# Patient Record
Sex: MALE | Race: WHITE | NOT HISPANIC OR LATINO | URBAN - METROPOLITAN AREA
[De-identification: names, ages, dates, MRNs, and addresses within clinical notes are randomized per-mention and may not be internally consistent; named-entity substitution may affect disease eponyms.]

---

## 2018-04-13 ENCOUNTER — OUTPATIENT (OUTPATIENT)
Dept: OUTPATIENT SERVICES | Facility: HOSPITAL | Age: 83
LOS: 1 days | Discharge: HOME | End: 2018-04-13

## 2018-04-13 DIAGNOSIS — J45.909 UNSPECIFIED ASTHMA, UNCOMPLICATED: ICD-10-CM

## 2023-05-10 PROBLEM — Z00.00 ENCOUNTER FOR PREVENTIVE HEALTH EXAMINATION: Status: ACTIVE | Noted: 2023-05-10

## 2023-05-11 ENCOUNTER — OUTPATIENT (OUTPATIENT)
Dept: OUTPATIENT SERVICES | Facility: HOSPITAL | Age: 88
LOS: 1 days | End: 2023-05-11
Payer: MEDICARE

## 2023-05-11 DIAGNOSIS — N64.4 MASTODYNIA: ICD-10-CM

## 2023-05-11 DIAGNOSIS — R92.8 OTHER ABNORMAL AND INCONCLUSIVE FINDINGS ON DIAGNOSTIC IMAGING OF BREAST: ICD-10-CM

## 2023-05-11 PROCEDURE — 76642 ULTRASOUND BREAST LIMITED: CPT | Mod: 26,50

## 2023-05-11 PROCEDURE — 76642 ULTRASOUND BREAST LIMITED: CPT | Mod: 50

## 2023-05-11 PROCEDURE — 77066 DX MAMMO INCL CAD BI: CPT | Mod: 26

## 2023-05-11 PROCEDURE — G0279: CPT | Mod: 26

## 2023-05-11 PROCEDURE — 77066 DX MAMMO INCL CAD BI: CPT

## 2023-05-11 PROCEDURE — G0279: CPT

## 2023-05-12 DIAGNOSIS — R92.8 OTHER ABNORMAL AND INCONCLUSIVE FINDINGS ON DIAGNOSTIC IMAGING OF BREAST: ICD-10-CM

## 2023-05-14 ENCOUNTER — OUTPATIENT (OUTPATIENT)
Dept: OUTPATIENT SERVICES | Facility: HOSPITAL | Age: 88
LOS: 1 days | End: 2023-05-14
Payer: MEDICARE

## 2023-05-14 DIAGNOSIS — N50.82 SCROTAL PAIN: ICD-10-CM

## 2023-05-14 DIAGNOSIS — Z00.8 ENCOUNTER FOR OTHER GENERAL EXAMINATION: ICD-10-CM

## 2023-05-14 PROCEDURE — 76870 US EXAM SCROTUM: CPT

## 2023-05-14 PROCEDURE — 76870 US EXAM SCROTUM: CPT | Mod: 26

## 2023-05-15 DIAGNOSIS — N50.82 SCROTAL PAIN: ICD-10-CM

## 2023-06-21 ENCOUNTER — APPOINTMENT (OUTPATIENT)
Dept: UROLOGY | Facility: CLINIC | Age: 88
End: 2023-06-21
Payer: MEDICARE

## 2023-06-21 VITALS
WEIGHT: 137 LBS | HEIGHT: 60 IN | SYSTOLIC BLOOD PRESSURE: 145 MMHG | HEART RATE: 88 BPM | OXYGEN SATURATION: 97 % | BODY MASS INDEX: 26.9 KG/M2 | DIASTOLIC BLOOD PRESSURE: 78 MMHG

## 2023-06-21 DIAGNOSIS — N40.0 BENIGN PROSTATIC HYPERPLASIA WITHOUT LOWER URINARY TRACT SYMPMS: ICD-10-CM

## 2023-06-21 PROCEDURE — 99204 OFFICE O/P NEW MOD 45 MIN: CPT

## 2023-06-21 RX ORDER — TAMSULOSIN HYDROCHLORIDE 0.4 MG/1
0.4 CAPSULE ORAL
Qty: 90 | Refills: 3 | Status: ACTIVE | COMMUNITY
Start: 2023-06-21 | End: 1900-01-01

## 2023-06-21 NOTE — HISTORY OF PRESENT ILLNESS
[FreeTextEntry1] : Language: Dionicion\par Accompanied by: Wife\gabriel Contact info:  [phone] (home - preferred)\par Referring Provider/PCP:  Fausto Ojeda \par Son helped find via google\par \par \par Initial H&P 06/21/2023:\par Mr. Cartagena is a very pleasant 87 year old gentleman who presents today for initial evaluation of right hydrocele. \par \par Denies pain, denies skin irritation, denies sitting on his testicle as a result of the hydrocele. \par \par Noticed the problem about 3 months ago. \par \par Was sent for GIUSEPPE.  Has report with him today. \par \par Also c/o weak stream, frequency, sensation of incomplete emptying, intermittency, straining to void.\par Denies urgency, UTIs, GH, CINDY, PVD.\par 1x/night voiding. \par \par Has never seen urologist before. \par ---------------------------------------------------------------------------------------------------------------------------------------\par PMHx: Asthma\par PSHx: None\par SHx: Denies x3\par FHx: no known FHx  malignancy\par \par All: NKDA\par \par 14 pt ROS neg other than HPI\par ---------------------------------------------------------------------------------------------------------------------------------------\par Physical Exam:\par General: NAD, sitting on exam table comfortably\par HEENT: NCAT, EOMI\par Resp: breathing comfortably on RA, b/l symm chest rise\par Cardiac: RRR\par Abd: SNTND\par Back: (-) CVAT b/l\par MSK: GRAF w/ FROM\par Psych: appropriate affect\par : normal appearing circumcised phallus, normal meatus,  b/l desc testes, normal appearing scrotum, visible size difference between left and right sides, right hydrocele palpable with no appreciable defects, nontender, left testis mildly atrophic with no abnormal fidnings\par ---------------------------------------------------------------------------------------------------------------------------------------\par Results:\par \par ---------------------------------------------------------------------------------------------------------------------------------------\par A/P: 87M with Right Hydrocele and BPH.  Pt voided prior to evaluation today, therefore uroflow was not performed today.\par \par 1. Right Hydrocele\par Hydrocele is small (only 85cc on US) and not bothersome.  Discussed that operative intervention only recommended if hydrocele is bothersome in some way.  Pt elected to continue monitoring conservatively.\par \par 2. BPH\par We discussed that his constellation of urinary symptoms are likely due to BPH.  I explained the pathophysiology and natural course of prostatic enlargement, and how it causes the symptoms he is experiencing.  We briefly touched upon the various treatment options for BPH, including but not limited to to p.o. medications, office-based procedures, and surgery.  Among the p.o. medications, we specifically discussed alpha-blocker therapy as well as 5-Manoj.  To start, I recommended alpha-blocker therapy, specifically tamsulosin 0.4 mg daily. AEs were discussed, including but not limited to anejaculation, orthostatic hypotension, dizziness, lightheadedness, and sinus issues.\par \par Patient was interested in starting tamsulosin.  Discussed importance of taking it once daily, at night, consistently.  I will see him again in 6 weeks to reassess.\par \par I have also provided him with information on a Tajik-speaking Urologist in [place] (Dr. Danyel Partida), given the difficulty with commute to [place].  He will consider f/u and continuation of care with Dr. Partida in the future, if [place] travel continues to be difficult for him and his wife.\par \par Plan:\par - start tamsulosin\par - RTC 6 weeks or sooner PRN

## 2025-01-27 ENCOUNTER — INPATIENT (INPATIENT)
Facility: HOSPITAL | Age: 89
LOS: 0 days | Discharge: HOME HEALTH | DRG: 603 | End: 2025-01-28
Attending: STUDENT IN AN ORGANIZED HEALTH CARE EDUCATION/TRAINING PROGRAM | Admitting: STUDENT IN AN ORGANIZED HEALTH CARE EDUCATION/TRAINING PROGRAM
Payer: MEDICARE

## 2025-01-27 VITALS
DIASTOLIC BLOOD PRESSURE: 80 MMHG | RESPIRATION RATE: 18 BRPM | SYSTOLIC BLOOD PRESSURE: 166 MMHG | HEART RATE: 89 BPM | WEIGHT: 160.06 LBS | OXYGEN SATURATION: 100 % | HEIGHT: 67 IN | TEMPERATURE: 98 F

## 2025-01-27 DIAGNOSIS — L03.90 CELLULITIS, UNSPECIFIED: ICD-10-CM

## 2025-01-27 LAB
ALBUMIN SERPL ELPH-MCNC: 4.2 G/DL — SIGNIFICANT CHANGE UP (ref 3.5–5.2)
ALP SERPL-CCNC: 44 U/L — SIGNIFICANT CHANGE UP (ref 30–115)
ALT FLD-CCNC: 8 U/L — SIGNIFICANT CHANGE UP (ref 0–41)
ANION GAP SERPL CALC-SCNC: 10 MMOL/L — SIGNIFICANT CHANGE UP (ref 7–14)
AST SERPL-CCNC: 14 U/L — SIGNIFICANT CHANGE UP (ref 0–41)
BASOPHILS # BLD AUTO: 0.05 K/UL — SIGNIFICANT CHANGE UP (ref 0–0.2)
BASOPHILS NFR BLD AUTO: 0.4 % — SIGNIFICANT CHANGE UP (ref 0–1)
BILIRUB SERPL-MCNC: 0.5 MG/DL — SIGNIFICANT CHANGE UP (ref 0.2–1.2)
BUN SERPL-MCNC: 25 MG/DL — HIGH (ref 10–20)
CALCIUM SERPL-MCNC: 9.5 MG/DL — SIGNIFICANT CHANGE UP (ref 8.4–10.5)
CHLORIDE SERPL-SCNC: 104 MMOL/L — SIGNIFICANT CHANGE UP (ref 98–110)
CO2 SERPL-SCNC: 29 MMOL/L — SIGNIFICANT CHANGE UP (ref 17–32)
CREAT SERPL-MCNC: 1.5 MG/DL — SIGNIFICANT CHANGE UP (ref 0.7–1.5)
EGFR: 45 ML/MIN/1.73M2 — LOW
EOSINOPHIL # BLD AUTO: 0.09 K/UL — SIGNIFICANT CHANGE UP (ref 0–0.7)
EOSINOPHIL NFR BLD AUTO: 0.7 % — SIGNIFICANT CHANGE UP (ref 0–8)
ERYTHROCYTE [SEDIMENTATION RATE] IN BLOOD: 56 MM/HR — HIGH (ref 0–10)
GLUCOSE SERPL-MCNC: 122 MG/DL — HIGH (ref 70–99)
HCT VFR BLD CALC: 41.9 % — LOW (ref 42–52)
HGB BLD-MCNC: 13.4 G/DL — LOW (ref 14–18)
IMM GRANULOCYTES NFR BLD AUTO: 0.4 % — HIGH (ref 0.1–0.3)
LYMPHOCYTES # BLD AUTO: 1.9 K/UL — SIGNIFICANT CHANGE UP (ref 1.2–3.4)
LYMPHOCYTES # BLD AUTO: 15.6 % — LOW (ref 20.5–51.1)
MCHC RBC-ENTMCNC: 29.6 PG — SIGNIFICANT CHANGE UP (ref 27–31)
MCHC RBC-ENTMCNC: 32 G/DL — SIGNIFICANT CHANGE UP (ref 32–37)
MCV RBC AUTO: 92.5 FL — SIGNIFICANT CHANGE UP (ref 80–94)
MONOCYTES # BLD AUTO: 1.52 K/UL — HIGH (ref 0.1–0.6)
MONOCYTES NFR BLD AUTO: 12.5 % — HIGH (ref 1.7–9.3)
NEUTROPHILS # BLD AUTO: 8.55 K/UL — HIGH (ref 1.4–6.5)
NEUTROPHILS NFR BLD AUTO: 70.4 % — SIGNIFICANT CHANGE UP (ref 42.2–75.2)
NRBC # BLD: 0 /100 WBCS — SIGNIFICANT CHANGE UP (ref 0–0)
NRBC BLD-RTO: 0 /100 WBCS — SIGNIFICANT CHANGE UP (ref 0–0)
PLATELET # BLD AUTO: 256 K/UL — SIGNIFICANT CHANGE UP (ref 130–400)
PMV BLD: 10.1 FL — SIGNIFICANT CHANGE UP (ref 7.4–10.4)
POTASSIUM SERPL-MCNC: 4.5 MMOL/L — SIGNIFICANT CHANGE UP (ref 3.5–5)
POTASSIUM SERPL-SCNC: 4.5 MMOL/L — SIGNIFICANT CHANGE UP (ref 3.5–5)
PROT SERPL-MCNC: 6.9 G/DL — SIGNIFICANT CHANGE UP (ref 6–8)
RBC # BLD: 4.53 M/UL — LOW (ref 4.7–6.1)
RBC # FLD: 13.8 % — SIGNIFICANT CHANGE UP (ref 11.5–14.5)
SODIUM SERPL-SCNC: 143 MMOL/L — SIGNIFICANT CHANGE UP (ref 135–146)
URATE SERPL-MCNC: 8.6 MG/DL — SIGNIFICANT CHANGE UP (ref 3.4–8.8)
WBC # BLD: 12.16 K/UL — HIGH (ref 4.8–10.8)
WBC # FLD AUTO: 12.16 K/UL — HIGH (ref 4.8–10.8)

## 2025-01-27 PROCEDURE — 73610 X-RAY EXAM OF ANKLE: CPT | Mod: 26,RT

## 2025-01-27 PROCEDURE — 99285 EMERGENCY DEPT VISIT HI MDM: CPT

## 2025-01-27 PROCEDURE — 86140 C-REACTIVE PROTEIN: CPT

## 2025-01-27 PROCEDURE — 76882 US LMTD JT/FCL EVL NVASC XTR: CPT | Mod: RT

## 2025-01-27 PROCEDURE — 36415 COLL VENOUS BLD VENIPUNCTURE: CPT

## 2025-01-27 PROCEDURE — 73610 X-RAY EXAM OF ANKLE: CPT | Mod: RT

## 2025-01-27 PROCEDURE — 85025 COMPLETE CBC W/AUTO DIFF WBC: CPT

## 2025-01-27 PROCEDURE — 85652 RBC SED RATE AUTOMATED: CPT

## 2025-01-27 PROCEDURE — 80053 COMPREHEN METABOLIC PANEL: CPT

## 2025-01-27 PROCEDURE — 99222 1ST HOSP IP/OBS MODERATE 55: CPT

## 2025-01-27 RX ORDER — ZOLPIDEM TARTRATE 5 MG/1
1 TABLET, COATED ORAL
Refills: 0 | DISCHARGE

## 2025-01-27 RX ORDER — CEFAZOLIN SODIUM IN 0.9 % NACL 2 G/10 ML
2000 SYRINGE (ML) INTRAVENOUS ONCE
Refills: 0 | Status: COMPLETED | OUTPATIENT
Start: 2025-01-27 | End: 2025-01-27

## 2025-01-27 RX ORDER — KETOROLAC TROMETHAMINE 10 MG
30 TABLET ORAL ONCE
Refills: 0 | Status: DISCONTINUED | OUTPATIENT
Start: 2025-01-27 | End: 2025-01-27

## 2025-01-27 RX ORDER — CEFAZOLIN SODIUM IN 0.9 % NACL 2 G/10 ML
SYRINGE (ML) INTRAVENOUS
Refills: 0 | Status: DISCONTINUED | OUTPATIENT
Start: 2025-01-27 | End: 2025-01-28

## 2025-01-27 RX ORDER — ALBUTEROL 90 MCG
2 AEROSOL REFILL (GRAM) INHALATION THREE TIMES A DAY
Refills: 0 | Status: DISCONTINUED | OUTPATIENT
Start: 2025-01-27 | End: 2025-01-28

## 2025-01-27 RX ORDER — ALBUTEROL 90 MCG
2 AEROSOL REFILL (GRAM) INHALATION
Refills: 0 | DISCHARGE

## 2025-01-27 RX ORDER — ACETAMINOPHEN 160 MG/5ML
650 SUSPENSION ORAL EVERY 6 HOURS
Refills: 0 | Status: DISCONTINUED | OUTPATIENT
Start: 2025-01-27 | End: 2025-01-28

## 2025-01-27 RX ORDER — CEFAZOLIN SODIUM IN 0.9 % NACL 2 G/10 ML
2000 SYRINGE (ML) INTRAVENOUS EVERY 8 HOURS
Refills: 0 | Status: DISCONTINUED | OUTPATIENT
Start: 2025-01-27 | End: 2025-01-28

## 2025-01-27 RX ORDER — ZOLPIDEM TARTRATE 5 MG/1
10 TABLET, COATED ORAL AT BEDTIME
Refills: 0 | Status: DISCONTINUED | OUTPATIENT
Start: 2025-01-27 | End: 2025-01-28

## 2025-01-27 RX ADMIN — Medication 100 MILLIGRAM(S): at 15:42

## 2025-01-27 RX ADMIN — Medication 30 MILLIGRAM(S): at 14:35

## 2025-01-27 RX ADMIN — Medication 100 MILLIGRAM(S): at 21:22

## 2025-01-27 NOTE — H&P ADULT - TIME BILLING
Total time spent to complete patient's bedside assessment, review medical chart, discuss medical plan of care with covering medical team was more than 55 minutes  with >50% of time spent face to face with patient, discussion with patient/family and/or coordination of care.

## 2025-01-27 NOTE — H&P ADULT - NSHPREVIEWOFSYSTEMS_GEN_ALL_CORE
CONSTITUTIONAL: No weakness, fevers, or chills  EYES/ENT: No visual changes;  No vertigo or throat pain   NECK: No pain or stiffness  RESPIRATORY: No cough, wheezing, hemoptysis; No shortness of breath  CARDIOVASCULAR: No chest pain or palpitations  GASTROINTESTINAL: No abdominal or epigastric pain; No nausea, vomiting, diarrhea, or constipation; No hematemesis, melena, or hematochezia  GENITOURINARY: No dysuria, frequency, or hematuria  NEUROLOGICAL: No numbness or weakness  MSK: Right foot pain from ankle into fifth toe.   SKIN: No itching or new rashes

## 2025-01-27 NOTE — PATIENT PROFILE ADULT - FUNCTIONAL ASSESSMENT - BASIC MOBILITY SECTION LABEL
Principal Discharge DX:	Low back pain without sciatica, unspecified back pain laterality, unspecified chronicity .

## 2025-01-27 NOTE — ED PROVIDER NOTE - OBJECTIVE STATEMENT
89yoM PMHx asthma and prior hernia presenting for right ankle pain and subjective fever since yesterday. Pt reports worsening pain and inability to bear weight on his right foot now secondary to pain, despite taking tylenol and ibuprofen at home. Denies any calf pain or tenderness, chest pain, sob, nausea, vomiting, cough, congestion, diarreha, numbness, tingling, or history of gout

## 2025-01-27 NOTE — ED PROVIDER NOTE - EKG/XRAY ADDITIONAL INFORMATION
Independent interpretation of the right ankle X-Ray film(s) performed by Babatunde Beltran are negative for Fractures, dislocations, or subluxations.

## 2025-01-27 NOTE — H&P ADULT - ATTENDING COMMENTS
Patient seen on 01/27/25.    Patient is an 89yo male with a PMHx of asthma presenting for right ankle pain and subjective fever since yesterday. Admitted to medicine for cellulitis.    #Right Ankle Cellulitis  Less likely Septic Arthitis   Unlikely Ankle Sprain or Fracture  Unlikely Gout  WBC 12k  Uric Acid WNL  Bedside POCUS showing no joint effusion, but also showing focal cellulitis superficially  No history of ankle trauma, surgery, or gout  CRP ESR - Pending  Xray- Pending  BCx- Pending  Started on Cefazolin 2g IV q8   s/p Toradol 30mg IV for pain.   Started on Acetaminophen 650mg q6 for mild to moderate pain    #Asthma  cw Albuterol Inhaler 2 puff TID PRN    #Insomnia  cw Zolpidem 10mg PO qh     #Diet: Regular   #DVT pro: None  #GI pro: None  #Pending:   CRP ESR - Pending  Xray- Pending  BCx- Pending  Started on Cefazolin 2g IV q8   Anticipate DC in 24-48 Hours

## 2025-01-27 NOTE — H&P ADULT - ASSESSMENT
Patient is an 88yo male with a PMHx of asthma presenting for right ankle pain and subjective fever since yesterday. Admitted to medicine for cellulitis.    #Right Ankle Cellulitis  WBC 12k  Uric Acid WNL  Bedside POCUS showing no joint effusion, but also showing focal cellulitis superficially  CRP ESR - Pending  Xray- Pending    #Asthma    #Diet:  #DVT pro:  #GI pro:  #Pending:    Patient is an 89yo male with a PMHx of asthma presenting for right ankle pain and subjective fever since yesterday. Admitted to medicine for cellulitis.    #Right Ankle Cellulitis  Less likely Septic Arthitis   Unlikely Ankle Sprain or Fracture  Unlikely Gout  WBC 12k  Uric Acid WNL  Bedside POCUS showing no joint effusion, but also showing focal cellulitis superficially  No history of ankle trauma, surgery, or gout  CRP ESR - Pending  Xray- Pending  BCx- Pending  Started on Cefazolin 2g IV q8   s/p Toradol 30mg IV for pain.   Started on Acetaminophen 650mg q6 for mild to moderate pain  PT evaluation not needed    #Asthma  cw Albuterol Inhaler 2 puff TID PRN    #Insomnia  cw Zolpidem 10mg PO qh     #Diet: Regular   #DVT pro: None  #GI pro: None  #Pending:   CRP ESR - Pending  Xray- Pending  BCx- Pending  Started on Cefazolin 2g IV q8   Anticipate DC in 24-48 Hours    Patient is an 89yo male with a PMHx of asthma presenting for right ankle pain and subjective fever since yesterday. Admitted to medicine for cellulitis.    #Right Ankle Cellulitis  Less likely Septic Arthitis   Unlikely Ankle Sprain or Fracture  Unlikely Gout  WBC 12k  Uric Acid WNL  Bedside POCUS showing no joint effusion, but also showing focal cellulitis superficially  No history of ankle trauma, surgery, or gout  CRP ESR - Pending  Xray- Pending  BCx- Pending  Started on Cefazolin 2g IV q8   s/p Toradol 30mg IV for pain.   Started on Acetaminophen 650mg q6 for mild to moderate pain    #Asthma  cw Albuterol Inhaler 2 puff TID PRN    #Insomnia  cw Zolpidem 10mg PO qh     #Diet: Regular   #DVT pro: None  #GI pro: None  #Pending:   CRP ESR - Pending  Xray- Pending  BCx- Pending  Started on Cefazolin 2g IV q8   Anticipate DC in 24-48 Hours

## 2025-01-27 NOTE — PATIENT PROFILE ADULT - FALL HARM RISK - HARM RISK INTERVENTIONS

## 2025-01-27 NOTE — ED ADULT TRIAGE NOTE - CHIEF COMPLAINT QUOTE
Pt. BIBA c/o R leg swelling since yesterday and difficulty ambulating. Pt. has hx of chronic back pain.

## 2025-01-27 NOTE — H&P ADULT - HISTORY OF PRESENT ILLNESS
Patient is an 88yo male with a PMHx of asthma and prior hernia presenting for right ankle pain and subjectives fever since yesterday. Pt reports worsening pain and inability to bear weight on his right foot now secondary to pain, despite taking tylenol and ibuprofen at home. Denies any calf pain or tenderness, chest pain, sob, nausea, vomiting, cough, congestion, diarreha, numbness, tingling, or history of gout.    VITAL SIGNS: Last 24 Hours  T(C): 36.8 (27 Jan 2025 13:00), Max: 36.8 (27 Jan 2025 13:00)  T(F): 98.2 (27 Jan 2025 13:00), Max: 98.2 (27 Jan 2025 13:00)  HR: 89 (27 Jan 2025 13:00) (89 - 89)  BP: 166/80 (27 Jan 2025 13:00) (166/80 - 166/80)  BP(mean): --  RR: 18 (27 Jan 2025 13:00) (18 - 18)  SpO2: 100% (27 Jan 2025 13:00) (100% - 100%)    In the ED patient underwent  bedside POCUS showing no joint effusion, but also showing focal cellulitis superficially. He was started on Cefazolin 2g IV q8.   Labs showed an elevated WBC of 12K, otherwise all other labs are unremarkable Patient is an 88yo Dutch speaking male with a PMHx of asthma and prior hernia presenting for right ankle pain and subjective fever since yesterday. He reports that yesterday his right fifth toe started to suddenly hurt, and progressively it became painful to put weight on it. He describes the pain as 8/10 sharp pain when putting weight on it, and that it feel only slightly painful at rest. He's tried Tylenol and Ibuprofen, but has minimal relief from it. He denies any history of trauma, surgery, or gout. He denies any chest pain, heart palpitation, nausea, vomiting, SOB, coughing, wheezing, numbness, and tingling, He denies any recent travel.      VITAL SIGNS: Last 24 Hours  T(C): 36.8 (27 Jan 2025 13:00), Max: 36.8 (27 Jan 2025 13:00)  T(F): 98.2 (27 Jan 2025 13:00), Max: 98.2 (27 Jan 2025 13:00)  HR: 89 (27 Jan 2025 13:00) (89 - 89)  BP: 166/80 (27 Jan 2025 13:00) (166/80 - 166/80)  BP(mean): --  RR: 18 (27 Jan 2025 13:00) (18 - 18)  SpO2: 100% (27 Jan 2025 13:00) (100% - 100%)    In the ED patient underwent  bedside POCUS showing no joint effusion, but also showing focal cellulitis superficially. He was started on Cefazolin 2g IV q8 and received a one time dose of Toradol 30mg IV for pain.   Labs showed an elevated WBC of 12K, otherwise all other labs are unremarkable Patient is an 88yo Cuban speaking male with a PMHx of asthma and prior hernia presenting for right ankle pain and subjective fever since yesterday. He reports that yesterday his right fifth toe started to suddenly hurt, and progressively it became painful to put weight on it. He describes the pain as 8/10 sharp pain when putting weight on it, and that it feel only slightly painful at rest. He's tried Tylenol and Ibuprofen, but has minimal relief from it. He denies any history of trauma, surgery, or gout. He denies any chest pain, heart palpitation, nausea, vomiting, SOB, coughing, wheezing, numbness, and tingling, He denies any recent travel.      VITAL SIGNS: Last 24 Hours  T(C): 36.8 (27 Jan 2025 13:00), Max: 36.8 (27 Jan 2025 13:00)  T(F): 98.2 (27 Jan 2025 13:00), Max: 98.2 (27 Jan 2025 13:00)  HR: 89 (27 Jan 2025 13:00) (89 - 89)  BP: 166/80 (27 Jan 2025 13:00) (166/80 - 166/80)  RR: 18 (27 Jan 2025 13:00) (18 - 18)  SpO2: 100% (27 Jan 2025 13:00) (100% - 100%)    In the ED patient underwent  bedside POCUS showing no joint effusion, but also showing focal cellulitis superficially. He was started on Cefazolin 2g IV q8 and received a one time dose of Toradol 30mg IV for pain.   Labs showed an elevated WBC of 12K, otherwise all other labs are unremarkable

## 2025-01-27 NOTE — H&P ADULT - NSHPPHYSICALEXAM_GEN_ALL_CORE
T(C): 37.1 (01-27-25 @ 21:00), Max: 37.1 (01-27-25 @ 21:00)  HR: 86 (01-27-25 @ 21:00) (86 - 89)  BP: 165/72 (01-27-25 @ 21:00) (165/72 - 166/80)  RR: 18 (01-27-25 @ 21:00) (18 - 18)  SpO2: 95% (01-27-25 @ 21:00) (95% - 100%)    CONSTITUTIONAL: Well groomed, no apparent distress  EYES: PERRLA and symmetric, EOMI, No conjunctival or scleral injection, non-icteric  ENMT: Oral mucosa with moist membranes. Normal dentition; no pharyngeal injection or exudates             NECK: Supple, symmetric and without tracheal deviation   RESP: No respiratory distress, no use of accessory muscles; CTA b/l, no WRR  CV: RRR, +S1S2, no MRG; no JVD; no peripheral edema  GI: Soft, NT, ND, no rebound, no guarding; no palpable masses; no hepatosplenomegaly; no hernia palpated  LYMPH: No cervical LAD or tenderness; no axillary LAD or tenderness; no inguinal LAD or tenderness    MSK: Decreased ROM of the right foot with dosal and ankle flexion secondary to pain.     SKIN: No rashes or ulcers noted; no subcutaneous nodules or induration palpable  NEURO: CN II-XII intact; normal reflexes in upper and lower extremities, sensation intact in upper and lower extremities b/l to light touch   PSYCH: Appropriate insight/judgment; A+O x 3, mood and affect appropriate, recent/remote memory intact T(C): 37.1 (01-27-25 @ 21:00), Max: 37.1 (01-27-25 @ 21:00)  HR: 86 (01-27-25 @ 21:00) (86 - 89)  BP: 165/72 (01-27-25 @ 21:00) (165/72 - 166/80)  RR: 18 (01-27-25 @ 21:00) (18 - 18)  SpO2: 95% (01-27-25 @ 21:00) (95% - 100%)    CONSTITUTIONAL: Well groomed, no apparent distress  EYES: PERRLA and symmetric, EOMI, No conjunctival or scleral injection, non-icteric  ENMT: Oral mucosa with moist membranes. Normal dentition; no pharyngeal injection or exudates  NECK: Supple, symmetric and without tracheal deviation   RESP: No respiratory distress, no use of accessory muscles; CTA b/l, no WRR  CV: RRR, +S1S2, no MRG; no JVD; no peripheral edema  GI: Soft, NT, ND, no rebound, no guarding; no palpable masses; no hepatosplenomegaly; no hernia palpated  LYMPH: No cervical LAD or tenderness; no axillary LAD or tenderness; no inguinal LAD or tenderness  MSK: Decreased ROM of the right foot with dorsal and ankle flexion secondary to pain. swelling and focal tenderness to right ankle, worse on medial surface  SKIN: swelling and focal tenderness to right ankle, worse on medial surface  NEURO: non-focal   PSYCH: AO x 3

## 2025-01-27 NOTE — ED ADULT NURSE NOTE - NSFALLHARMRISKINTERV_ED_ALL_ED
Assistance OOB with selected safe patient handling equipment if applicable/Assistance with ambulation/Communicate risk of Fall with Harm to all staff, patient, and family/Monitor gait and stability/Provide visual cue: red socks, yellow wristband, yellow gown, etc/Reinforce activity limits and safety measures with patient and family/Bed in lowest position, wheels locked, appropriate side rails in place/Call bell, personal items and telephone in reach/Instruct patient to call for assistance before getting out of bed/chair/stretcher/Non-slip footwear applied when patient is off stretcher/[place] to call system/Physically safe environment - no spills, clutter or unnecessary equipment/Purposeful Proactive Rounding/Room/bathroom lighting operational, light cord in reach

## 2025-01-27 NOTE — ED ADULT NURSE NOTE - OBJECTIVE STATEMENT
pt presenting for right ankle pain and subjective fever since yesterday. Pt reports worsening pain and inability to bear weight on his right foot now secondary to pain, despite taking tylenol and ibuprofen at 10 am

## 2025-01-27 NOTE — H&P ADULT - NSHPLABSRESULTS_GEN_ALL_CORE
13.4   12.16 )-----------( 256      ( 27 Jan 2025 14:51 )             41.9     01-27    143  |  104  |  25[H]  ----------------------------<  122[H]  4.5   |  29  |  1.5    Ca    9.5      27 Jan 2025 14:51    TPro  6.9  /  Alb  4.2  /  TBili  0.5  /  DBili  x   /  AST  14  /  ALT  8   /  AlkPhos  44  01-27      Urinalysis Basic - ( 27 Jan 2025 14:51 )    Color: x / Appearance: x / SG: x / pH: x  Gluc: 122 mg/dL / Ketone: x  / Bili: x / Urobili: x   Blood: x / Protein: x / Nitrite: x   Leuk Esterase: x / RBC: x / WBC x   Sq Epi: x / Non Sq Epi: x / Bacteria: x

## 2025-01-27 NOTE — ED PROVIDER NOTE - PHYSICAL EXAMINATION
CONSTITUTIONAL: NAD  SKIN: Warm, dry  HEAD: NCAT  EYES: Clear conjunctiva   ENT: MMM  NECK: Supple  CARD: RRR, S1, S2; no M/R/G  RESP: Normal respiratory effort, CTAB  ABD: Soft, nontender. No rebound, rigidity, or guarding  EXT: swelling and focal tenderness to right ankle, worse on medial surface, pain on any ROM  NEURO: Grossly intact. Awake, alert, moving all extremities, no facial asymmetry. CONSTITUTIONAL: NAD  SKIN: Warm, dry  HEAD: NCAT  EYES: Clear conjunctiva   ENT: MMM  NECK: Supple  CARD: RRR, S1, S2; no M/R/G  RESP: Normal respiratory effort, CTAB  ABD: Soft, nontender. No rebound, rigidity, or guarding  EXT: swelling and focal tenderness to right ankle, worse on medial surface, pain on any ROM of the ankle. No swelling or pain of the right knee or calf, full range of motion of left leg  NEURO: Grossly intact. Awake, alert, moving all extremities, no facial asymmetry.

## 2025-01-27 NOTE — ED PROVIDER NOTE - CLINICAL SUMMARY MEDICAL DECISION MAKING FREE TEXT BOX
89-year-old male presents to the ED for right ankle pain and redness.  Failed outpatient therapy for cellulitis.  Physical exam revealed focal erythema and tenderness to the right ankle.  Worsening swelling and pain.  Unable to ambulate on the right lower extremity.  We obtained labs which revealed leukocytosis.  X-ray with soft tissue swelling appreciated.  Due to failure of outpatient antibiotic therapy.  Decision was made to admit.

## 2025-01-27 NOTE — ED PROVIDER NOTE - PROGRESS NOTE DETAILS
ELENO-- ultrasound team performed bedside POCUS showing no joint effusion, but also showing focal cellulitis superficially. Plan for admission with IV antibiotics and rehab

## 2025-01-27 NOTE — PATIENT PROFILE ADULT - LANGUAGE ASSISTANCE NEEDED
Kenzie Gonzalez (RN) Togolese speaking/No-Patient/Caregiver offered and refused free interpretation services.

## 2025-01-28 ENCOUNTER — TRANSCRIPTION ENCOUNTER (OUTPATIENT)
Age: 89
End: 2025-01-28

## 2025-01-28 VITALS
TEMPERATURE: 99 F | HEART RATE: 90 BPM | DIASTOLIC BLOOD PRESSURE: 76 MMHG | SYSTOLIC BLOOD PRESSURE: 156 MMHG | OXYGEN SATURATION: 94 %

## 2025-01-28 LAB
ALBUMIN SERPL ELPH-MCNC: 3.4 G/DL — LOW (ref 3.5–5.2)
ALP SERPL-CCNC: 33 U/L — SIGNIFICANT CHANGE UP (ref 30–115)
ALT FLD-CCNC: 6 U/L — SIGNIFICANT CHANGE UP (ref 0–41)
ANION GAP SERPL CALC-SCNC: 11 MMOL/L — SIGNIFICANT CHANGE UP (ref 7–14)
AST SERPL-CCNC: 14 U/L — SIGNIFICANT CHANGE UP (ref 0–41)
BASOPHILS # BLD AUTO: 0.07 K/UL — SIGNIFICANT CHANGE UP (ref 0–0.2)
BASOPHILS NFR BLD AUTO: 0.7 % — SIGNIFICANT CHANGE UP (ref 0–1)
BILIRUB SERPL-MCNC: 0.3 MG/DL — SIGNIFICANT CHANGE UP (ref 0.2–1.2)
BUN SERPL-MCNC: 23 MG/DL — HIGH (ref 10–20)
CALCIUM SERPL-MCNC: 8.8 MG/DL — SIGNIFICANT CHANGE UP (ref 8.4–10.4)
CHLORIDE SERPL-SCNC: 108 MMOL/L — SIGNIFICANT CHANGE UP (ref 98–110)
CO2 SERPL-SCNC: 23 MMOL/L — SIGNIFICANT CHANGE UP (ref 17–32)
CREAT SERPL-MCNC: 1.3 MG/DL — SIGNIFICANT CHANGE UP (ref 0.7–1.5)
CRP SERPL-MCNC: 87.7 MG/L — HIGH
EGFR: 53 ML/MIN/1.73M2 — LOW
EOSINOPHIL # BLD AUTO: 0.15 K/UL — SIGNIFICANT CHANGE UP (ref 0–0.7)
EOSINOPHIL NFR BLD AUTO: 1.6 % — SIGNIFICANT CHANGE UP (ref 0–8)
ERYTHROCYTE [SEDIMENTATION RATE] IN BLOOD: 61 MM/HR — HIGH (ref 0–10)
GLUCOSE SERPL-MCNC: 114 MG/DL — HIGH (ref 70–99)
HCT VFR BLD CALC: 37.7 % — LOW (ref 42–52)
HGB BLD-MCNC: 12.4 G/DL — LOW (ref 14–18)
IMM GRANULOCYTES NFR BLD AUTO: 0.2 % — SIGNIFICANT CHANGE UP (ref 0.1–0.3)
LYMPHOCYTES # BLD AUTO: 1.48 K/UL — SIGNIFICANT CHANGE UP (ref 1.2–3.4)
LYMPHOCYTES # BLD AUTO: 15.8 % — LOW (ref 20.5–51.1)
MCHC RBC-ENTMCNC: 29.7 PG — SIGNIFICANT CHANGE UP (ref 27–31)
MCHC RBC-ENTMCNC: 32.9 G/DL — SIGNIFICANT CHANGE UP (ref 32–37)
MCV RBC AUTO: 90.4 FL — SIGNIFICANT CHANGE UP (ref 80–94)
MONOCYTES # BLD AUTO: 0.95 K/UL — HIGH (ref 0.1–0.6)
MONOCYTES NFR BLD AUTO: 10.2 % — HIGH (ref 1.7–9.3)
NEUTROPHILS # BLD AUTO: 6.68 K/UL — HIGH (ref 1.4–6.5)
NEUTROPHILS NFR BLD AUTO: 71.5 % — SIGNIFICANT CHANGE UP (ref 42.2–75.2)
NRBC # BLD: 0 /100 WBCS — SIGNIFICANT CHANGE UP (ref 0–0)
NRBC BLD-RTO: 0 /100 WBCS — SIGNIFICANT CHANGE UP (ref 0–0)
PLATELET # BLD AUTO: 228 K/UL — SIGNIFICANT CHANGE UP (ref 130–400)
PMV BLD: 9.9 FL — SIGNIFICANT CHANGE UP (ref 7.4–10.4)
POTASSIUM SERPL-MCNC: 4 MMOL/L — SIGNIFICANT CHANGE UP (ref 3.5–5)
POTASSIUM SERPL-SCNC: 4 MMOL/L — SIGNIFICANT CHANGE UP (ref 3.5–5)
PROT SERPL-MCNC: 5.9 G/DL — LOW (ref 6–8)
RBC # BLD: 4.17 M/UL — LOW (ref 4.7–6.1)
RBC # FLD: 13.5 % — SIGNIFICANT CHANGE UP (ref 11.5–14.5)
SODIUM SERPL-SCNC: 142 MMOL/L — SIGNIFICANT CHANGE UP (ref 135–146)
WBC # BLD: 9.35 K/UL — SIGNIFICANT CHANGE UP (ref 4.8–10.8)
WBC # FLD AUTO: 9.35 K/UL — SIGNIFICANT CHANGE UP (ref 4.8–10.8)

## 2025-01-28 PROCEDURE — 76882 US LMTD JT/FCL EVL NVASC XTR: CPT | Mod: 26,RT

## 2025-01-28 PROCEDURE — 99239 HOSP IP/OBS DSCHRG MGMT >30: CPT

## 2025-01-28 PROCEDURE — 73610 X-RAY EXAM OF ANKLE: CPT | Mod: 26,RT

## 2025-01-28 RX ORDER — CEPHALEXIN 500 MG
500 CAPSULE ORAL
Refills: 0 | Status: DISCONTINUED | OUTPATIENT
Start: 2025-01-28 | End: 2025-01-28

## 2025-01-28 RX ORDER — CEPHALEXIN 500 MG
1 CAPSULE ORAL
Qty: 40 | Refills: 0
Start: 2025-01-28 | End: 2025-02-06

## 2025-01-28 RX ADMIN — Medication 100 MILLIGRAM(S): at 05:15

## 2025-01-28 RX ADMIN — Medication 500 MILLIGRAM(S): at 12:43

## 2025-01-28 NOTE — DISCHARGE NOTE PROVIDER - CARE PROVIDER_API CALL
Ramírez Brooks  Internal Medicine  [address] [place] [address], Admin - Room [address] [place] [address]  Phone: [phone]  Fax: [phone]  Follow Up Time: 1 week

## 2025-01-28 NOTE — DISCHARGE NOTE PROVIDER - ATTENDING DISCHARGE PHYSICAL EXAMINATION:
CONSTITUTIONAL: Old male not in acute distress,   HEAD: Atraumatic, normocephalic  EYES: EOM intact, PERRLA, conjunctiva and sclera clear  ENT: Supple, no masses, no thyromegaly, no bruits, no JVD; moist mucous membranes  PULMONARY: Clear to auscultation bilaterally; no wheezes, rales, or rhonchi  CARDIOVASCULAR: Regular rate and rhythm; no murmurs, rubs, or gallops  GASTROINTESTINAL: Soft, non-tender, non-distended; bowel sounds present  MUSCULOSKELETAL: 2+ peripheral pulses; no clubbing, no cyanosis, no edema  resolving tenderness w no swelling over the lateral aspect of the right ankle, ROM is intact   NEUROLOGY: AAO x3, moves all exts, non-focal  SKIN: No rashes or lesions; warm and dry

## 2025-01-28 NOTE — DISCHARGE NOTE NURSING/CASE MANAGEMENT/SOCIAL WORK - FINANCIAL ASSISTANCE
[facility] provides services at a reduced cost to those who are determined to be eligible through [facility]’s financial assistance program. Information regarding [facility]’s financial assistance program can be found by going to [email or web address] or by calling [phone].

## 2025-01-28 NOTE — DISCHARGE NOTE PROVIDER - NSDCMRMEDTOKEN_GEN_ALL_CORE_FT
Albuterol: 2 puff(s) inhaled 2 times a day as needed for  shortness of breath and/or wheezing  cephalexin 500 mg oral capsule: 1 cap(s) orally 4 times a day  zolpidem 10 mg oral tablet: 1 tab(s) orally once a day (at bedtime) as needed for  insomnia

## 2025-01-28 NOTE — DISCHARGE NOTE NURSING/CASE MANAGEMENT/SOCIAL WORK - PATIENT PORTAL LINK FT
You can access the FollowMyHealth Patient Portal offered by [facility] by registering at the following website: [email or web address]. By joining [employer]’s FollowMyHealth portal, you will also be able to view your health information using other applications (apps) compatible with our system.

## 2025-01-28 NOTE — DISCHARGE NOTE PROVIDER - NSDCCPCAREPLAN_GEN_ALL_CORE_FT
PRINCIPAL DISCHARGE DIAGNOSIS  Diagnosis: Cellulitis  Assessment and Plan of Treatment: Cellulitis  Cellulitis is a skin infection caused by bacteria. This condition occurs most often in the arms and lower legs but can occur anywhere over the body. Symptoms include redness, swelling, warm skin, tenderness, and chills/fever. If you were prescribed an antibiotic medicine, take it as told by your health care provider. Do not stop taking the antibiotic even if you start to feel better.  SEEK IMMEDIATE MEDICAL CARE IF YOU HAVE ANY OF THE FOLLOWING SYMPTOMS: worsening fever, red streaks coming from affected area, vomiting or diarrhea, or dizziness/lightheadedness.  Please follow up with your primary care doctor in 1-2 weeks

## 2025-01-28 NOTE — DISCHARGE NOTE PROVIDER - HOSPITAL COURSE
Patient is an 88yo male with a PMHx of asthma presenting for right ankle pain and subjective fever since yesterday. Admitted to medicine for cellulitis. Exact plan as delineated below:    #Right Ankle Cellulitis  Less likely Septic Arthitis   Unlikely Ankle Sprain or Fracture  Unlikely Gout  WBC 12k>9k  Uric Acid WNL  Bedside POCUS showing no joint effusion, but also showing focal cellulitis superficially  No history of ankle trauma, surgery, or gout  CRP- 87.7ESR - 61  Xray- Overlying sock with irregular densities limits evaluation of fine bony detail. Mild degenerative changes of the ankle joint and talonavicular joint. Mild calcaneal spurring. Ankle mortise is intact. No acute fracture or dislocation. Soft tissue swelling overlying the medial and lateral malleoli without fracture or dislocation. Ankle mortise is intact. Bone mineralization is normal.  Started on Cefazolin 2g IV q8   s/p Toradol 30mg IV for pain.   Started on Acetaminophen 650mg q6 for mild to moderate pain    #Asthma  cw Albuterol Inhaler 2 puff TID PRN    #Insomnia  cw Zolpidem 10mg PO qh     Discussion of discharge plan of care, including discharge diagnoses, medication reconciliation, and follow-ups was conducted with Dr. Jackman on 1/28/25 at 10AM, and discharge was approved.     Patient is an 88yo male with a PMHx of asthma presenting for right ankle pain and subjective fever since yesterday. Admitted to medicine for cellulitis. Exact plan as delineated below:    #Right Ankle Cellulitis  Less likely Septic Arthitis   Unlikely Ankle Sprain or Fracture  Unlikely Gout  WBC 12k>9k  Uric Acid WNL  Bedside POCUS showing no joint effusion, but also showing focal cellulitis superficially  No history of ankle trauma, surgery, or gout  CRP- 87.7ESR - 61  Xray- Overlying sock with irregular densities limits evaluation of fine bony detail. Mild degenerative changes of the ankle joint and talonavicular joint. Mild calcaneal spurring. Ankle mortise is intact. No acute fracture or dislocation. Soft tissue swelling overlying the medial and lateral malleoli without fracture or dislocation. Ankle mortise is intact. Bone mineralization is normal.  Started on Cefazolin 2g IV q8   s/p Toradol 30mg IV for pain.   Started on Acetaminophen 650mg q6 for mild to moderate pain    #Asthma  cw Albuterol Inhaler 2 puff TID PRN    #Insomnia  cw Zolpidem 10mg PO qh     Discussion of discharge plan of care, including discharge diagnoses, medication reconciliation, and follow-ups was conducted with Dr. Jackman on 1/28/25 at 10AM, and discharge was approved: Right ankle cellulitis, symptoms improved w cefazolin, can dc on kelfex, plans yuri pt, his wife and son over the phone.

## 2025-02-01 LAB
CULTURE RESULTS: SIGNIFICANT CHANGE UP
CULTURE RESULTS: SIGNIFICANT CHANGE UP
SPECIMEN SOURCE: SIGNIFICANT CHANGE UP
SPECIMEN SOURCE: SIGNIFICANT CHANGE UP

## 2025-02-04 DIAGNOSIS — J45.909 UNSPECIFIED ASTHMA, UNCOMPLICATED: ICD-10-CM

## 2025-02-04 DIAGNOSIS — G47.00 INSOMNIA, UNSPECIFIED: ICD-10-CM

## 2025-02-04 DIAGNOSIS — L03.115 CELLULITIS OF RIGHT LOWER LIMB: ICD-10-CM
